# Patient Record
Sex: FEMALE | Race: OTHER | NOT HISPANIC OR LATINO | URBAN - METROPOLITAN AREA
[De-identification: names, ages, dates, MRNs, and addresses within clinical notes are randomized per-mention and may not be internally consistent; named-entity substitution may affect disease eponyms.]

---

## 2023-10-07 ENCOUNTER — EMERGENCY (EMERGENCY)
Facility: HOSPITAL | Age: 73
LOS: 1 days | Discharge: HOME | End: 2023-10-07
Attending: EMERGENCY MEDICINE
Payer: COMMERCIAL

## 2023-10-07 VITALS
TEMPERATURE: 98 F | HEIGHT: 67 IN | WEIGHT: 179.9 LBS | DIASTOLIC BLOOD PRESSURE: 84 MMHG | HEART RATE: 79 BPM | OXYGEN SATURATION: 98 % | RESPIRATION RATE: 20 BRPM | SYSTOLIC BLOOD PRESSURE: 173 MMHG

## 2023-10-07 PROCEDURE — 99282 EMERGENCY DEPT VISIT SF MDM: CPT

## 2023-10-07 PROCEDURE — 99283 EMERGENCY DEPT VISIT LOW MDM: CPT

## 2023-10-07 RX ORDER — PSYLLIUM SEED (WITH DEXTROSE)
3.4 POWDER (GRAM) ORAL
Qty: 1 | Refills: 0
Start: 2023-10-07 | End: 2023-10-13

## 2023-10-07 RX ORDER — DOCUSATE SODIUM 100 MG
1 CAPSULE ORAL
Qty: 14 | Refills: 0
Start: 2023-10-07 | End: 2023-10-13

## 2023-10-07 NOTE — ED PROVIDER NOTE - NSFOLLOWUPINSTRUCTIONS_ED_ALL_ED_FT
Hemorrhoids    WHAT YOU NEED TO KNOW:    What are hemorrhoids? Hemorrhoids are swollen blood vessels inside your rectum (internal hemorrhoids) or on your anus (external hemorrhoids). Sometimes a hemorrhoid may prolapse. This means it extends out of your anus.    What increases my risk for hemorrhoids?    Pregnancy or obesity    Straining or sitting for a long time during bowel movements    Liver disease    Weak muscles around the anus caused by older age, rectal surgery, or anal intercourse    A lack of physical activity    Chronic diarrhea or constipation    A low-fiber diet  What are the signs and symptoms of hemorrhoids?    Pain or itching around your anus or inside your rectum    Swelling or bumps around your anus    Bright red blood in your bowel movement, on the toilet paper, or in the toilet bowl    Tissue bulging out of your anus (prolapsed hemorrhoids)    Incontinence (poor control over urine or bowel movements)  How are hemorrhoids diagnosed? Your healthcare provider will ask about your symptoms, the foods you eat, and your bowel movements. He or she will examine your anus for external hemorrhoids. You may need the following:    A digital rectal exam is a test to check for hemorrhoids. Your healthcare provider will put a gloved finger inside your anus to feel for the hemorrhoids.    An anoscopy is a test that uses a scope (small tube with a light and camera on the end) to look at your hemorrhoids.  How are hemorrhoids treated? Treatment will depend on your symptoms. You may need any of the following:    Medicines can help decrease pain and swelling, and soften your bowel movement. The medicine may be a pill, pad, cream, or ointment.    Procedures may be used to shrink or remove your hemorrhoid. Examples include rubber-band ligation, sclerotherapy, and photocoagulation. These procedures may be done in your healthcare provider's office. Ask your healthcare provider for more information about these procedures.    Surgery may be needed to shrink or remove your hemorrhoids.  How can I manage my symptoms?    Apply ice on your anus for 15 to 20 minutes every hour or as directed. Use an ice pack, or put crushed ice in a plastic bag. Cover it with a towel before you apply it to your anus. Ice helps prevent tissue damage and decreases swelling and pain.    Take a sitz bath. Fill a bathtub with 4 to 6 inches of warm water. You may also use a sitz bath pan that fits inside a toilet bowl. Sit in the sitz bath for 15 minutes. Do this 3 times a day, and after each bowel movement. The warm water can help decrease pain and swelling.    Keep your anal area clean. Gently wash the area with warm water daily. Soap may irritate the area. After a bowel movement, wipe with moist towelettes or wet toilet paper. Dry toilet paper can irritate the area.  How can I help prevent hemorrhoids?    Do not strain to have a bowel movement. Do not sit on the toilet too long. These actions can increase pressure on the tissues in your rectum and anus.    Drink plenty of liquids. Liquids can help prevent constipation. Ask how much liquid to drink each day and which liquids are best for you.    Eat a variety of high-fiber foods. Examples include fruits, vegetables, and whole grains. Ask your healthcare provider how much fiber you need each day. You may need to take a fiber supplement.        Exercise as directed. Exercise, such as walking, may make it easier to have a bowel movement. Ask your healthcare provider to help you create an exercise plan.    Do not have anal sex. Anal sex can weaken the skin around your rectum and anus.    Avoid heavy lifting. This can cause straining and increase your risk for another hemorrhoid.  When should I seek immediate care?    You have severe pain in your rectum or around your anus.    You have severe pain in your abdomen and you are vomiting.    You have bleeding from your anus that soaks through your underwear.  When should I contact my healthcare provider?    You have frequent and painful bowel movements.    Your hemorrhoid looks or feels more swollen than usual.    You do not have a bowel movement for 2 days or more.    You see or feel tissue coming through your anus.    You have questions or concerns about your condition or care.  CARE AGREEMENT:    You have the right to help plan your care. Learn about your health condition and how it may be treated. Discuss treatment options with your healthcare providers to decide what care you want to receive. You always have the right to refuse treatment.

## 2023-10-07 NOTE — ED ADULT TRIAGE NOTE - LOCATION:
Verbal/written post procedure instructions were given to patient/caregiver./Instructed patient/caregiver to follow-up with primary care physician./Elevate the injured extremity as instructed./Keep the cast/splint/dressing clean and dry.
Left arm;

## 2023-10-07 NOTE — ED ADULT TRIAGE NOTE - CHIEF COMPLAINT QUOTE
walked  in  with  c/o  hemorrhoids  bleeding  x  4 days denies  any  use  of  blood  thinners. change  pads today  x 1. denies  any  N/V.  denies  any  use  of    blood  thinners.

## 2023-10-07 NOTE — ED PROVIDER NOTE - PATIENT PORTAL LINK FT
You can access the FollowMyHealth Patient Portal offered by [facility] by registering at the following website: [email or web address]. By joining [facility] FollowMyHealth portal, you will also be able to view your health information using other applications (apps) compatible with our system.

## 2023-10-07 NOTE — ED PROVIDER NOTE - CLINICAL SUMMARY MEDICAL DECISION MAKING FREE TEXT BOX
73 year old female with external hemorrhoid. No signs of any serious anemia. Will advise to continue topical medications with stool softeners and fibers.

## 2023-10-07 NOTE — ED PROVIDER NOTE - OBJECTIVE STATEMENT
73 year old female with a PMHx of DM and arthritis presents to the ED with complaints of rectal bleeding since Monday. Patient states she was seen at urgent care and was diagnose with hemorrhoid and prescribed steroid cream. Patient reports she continues to bleed in the bathroom. Also reports taking Preparation H. Endorses slight pain. Denies dizziness, weakness, and lightheadedness, as well as any history of abdominal surgeries. She says she has an appointment with GI in 10 days.